# Patient Record
Sex: MALE | Race: ASIAN | NOT HISPANIC OR LATINO | URBAN - METROPOLITAN AREA
[De-identification: names, ages, dates, MRNs, and addresses within clinical notes are randomized per-mention and may not be internally consistent; named-entity substitution may affect disease eponyms.]

---

## 2017-02-18 ENCOUNTER — EMERGENCY (EMERGENCY)
Facility: HOSPITAL | Age: 52
LOS: 1 days | Discharge: LEFT BEFORE DISCHARGE | End: 2017-02-18
Admitting: EMERGENCY MEDICINE

## 2017-02-18 VITALS
RESPIRATION RATE: 17 BRPM | TEMPERATURE: 98 F | HEART RATE: 82 BPM | DIASTOLIC BLOOD PRESSURE: 105 MMHG | OXYGEN SATURATION: 99 % | SYSTOLIC BLOOD PRESSURE: 153 MMHG

## 2017-02-18 NOTE — ED ADULT TRIAGE NOTE - CHIEF COMPLAINT QUOTE
Pt. c/o headache x 2 days. States he decided to check his blood pressure earlier today at the pharmacy and it was high (172/118). Reports that he doesn't take medication for BP because it "wasn't doing anything". Also states he has not taken any medications for the headache. Denies chest pain, SOB, dizziness or vision changes.

## 2017-02-18 NOTE — ED ADULT NURSE NOTE - EXPLANATION OF PATIENT'S REASON FOR LEAVING
pt. states he feels better and does not want to wait; educated on possible risks of leaving without being seen, states he will return if symptoms worsen.

## 2017-04-17 ENCOUNTER — EMERGENCY (EMERGENCY)
Facility: HOSPITAL | Age: 52
LOS: 1 days | Discharge: HOME | End: 2017-04-17
Attending: EMERGENCY MEDICINE | Admitting: EMERGENCY MEDICINE
Payer: SELF-PAY

## 2017-04-17 VITALS
OXYGEN SATURATION: 99 % | TEMPERATURE: 99 F | RESPIRATION RATE: 16 BRPM | DIASTOLIC BLOOD PRESSURE: 105 MMHG | SYSTOLIC BLOOD PRESSURE: 146 MMHG | HEART RATE: 88 BPM

## 2017-04-17 LAB
GAS PNL BLDV: 142 MMOL/L — SIGNIFICANT CHANGE UP (ref 136–146)
POTASSIUM BLDV-SCNC: 3.9 MMOL/L — SIGNIFICANT CHANGE UP (ref 3.4–4.5)

## 2017-04-17 PROCEDURE — 93010 ELECTROCARDIOGRAM REPORT: CPT

## 2017-04-17 PROCEDURE — 99053 MED SERV 10PM-8AM 24 HR FAC: CPT

## 2017-04-17 PROCEDURE — 99284 EMERGENCY DEPT VISIT MOD MDM: CPT | Mod: 25

## 2017-04-17 RX ORDER — KETOROLAC TROMETHAMINE 30 MG/ML
15 SYRINGE (ML) INJECTION ONCE
Qty: 0 | Refills: 0 | Status: DISCONTINUED | OUTPATIENT
Start: 2017-04-17 | End: 2017-04-17

## 2017-04-17 NOTE — ED PROVIDER NOTE - MEDICAL DECISION MAKING DETAILS
51M with numbness. Will assess electrolytes and d/c with neuro f/u. No signs/symptoms to suggest CVA or serious intracranial process.

## 2017-04-17 NOTE — ED PROVIDER NOTE - ATTENDING CONTRIBUTION TO CARE
pt with vague paresthesias.  Not consistent with a CVA.  Will check lytes to look for obvious etiology.  Neuro exam in ED is normal.

## 2017-04-17 NOTE — ED ADULT TRIAGE NOTE - CHIEF COMPLAINT QUOTE
pt c/o numbness in left hand and legs since this morning. pt denies any cp/n/v/abd pain. PMH HTN ekg in progress F[facility] Triage 118

## 2017-04-17 NOTE — ED PROVIDER NOTE - OBJECTIVE STATEMENT
51M PMH HTN p/w left lower extremity, left pinky, and left sided neck numbness that he woke up with this morning. No weakness or tingling. No hx of prior similar symptoms. Did not take any medications; does not regularly take any medications. Also notes some mild left trapezius muscle discomfort. No falls or trauma. No back pain. No difficulty ambulating. +smoker.

## 2017-04-17 NOTE — ED ADULT NURSE NOTE - OBJECTIVE STATEMENT
received to room 18. complains of numbness in left 5th finger and left leg since this am. has mild pain in left side of neck, started at the same time. denies any weakness. denies any other symptoms. strength equal in all extremities. normal neuro exam. appears comfortable at this time. no distress. will monitor.